# Patient Record
Sex: MALE | Race: WHITE | NOT HISPANIC OR LATINO | URBAN - METROPOLITAN AREA
[De-identification: names, ages, dates, MRNs, and addresses within clinical notes are randomized per-mention and may not be internally consistent; named-entity substitution may affect disease eponyms.]

---

## 2024-06-03 NOTE — ASU PATIENT PROFILE, ADULT - NS PREOP UNDERSTANDS INFO
Bring photo ID, insurance ,credit card/yes Bring photo ID, insurance ,credit cardbring photo id/insurance/credit card. Wear loose  comfort clothes. No alcohol/no drugs/smoking/no chewing gum/candies/milk. Must have an Escort. Address and phone #provided/yes

## 2024-06-03 NOTE — ASU PATIENT PROFILE, ADULT - NSICDXPASTSURGICALHX_GEN_ALL_CORE_FT
Pt was at his doctor for chemo treatment and was found to be hyperglycemic. . States complaint with meds.    PAST SURGICAL HISTORY:  H/O hemicolectomy     S/P appendectomy

## 2024-06-04 ENCOUNTER — OUTPATIENT (OUTPATIENT)
Dept: OUTPATIENT SERVICES | Facility: HOSPITAL | Age: 54
LOS: 1 days | Discharge: HOME | End: 2024-06-04
Payer: COMMERCIAL

## 2024-06-04 VITALS
RESPIRATION RATE: 16 BRPM | TEMPERATURE: 97 F | DIASTOLIC BLOOD PRESSURE: 76 MMHG | SYSTOLIC BLOOD PRESSURE: 112 MMHG | OXYGEN SATURATION: 97 % | HEIGHT: 68 IN | HEART RATE: 61 BPM | WEIGHT: 205.69 LBS

## 2024-06-04 VITALS
RESPIRATION RATE: 15 BRPM | OXYGEN SATURATION: 97 % | DIASTOLIC BLOOD PRESSURE: 78 MMHG | HEART RATE: 58 BPM | SYSTOLIC BLOOD PRESSURE: 133 MMHG

## 2024-06-04 DIAGNOSIS — Z90.49 ACQUIRED ABSENCE OF OTHER SPECIFIED PARTS OF DIGESTIVE TRACT: Chronic | ICD-10-CM

## 2024-06-04 PROCEDURE — 88305 TISSUE EXAM BY PATHOLOGIST: CPT | Mod: 26

## 2024-06-04 PROCEDURE — 88300 SURGICAL PATH GROSS: CPT | Mod: 26,59

## 2024-06-04 PROCEDURE — 88311 DECALCIFY TISSUE: CPT | Mod: 26

## 2024-06-04 DEVICE — SYS CATH BALLOON RELIEVA SINUS 6X16MM: Type: IMPLANTABLE DEVICE | Status: FUNCTIONAL

## 2024-06-04 DEVICE — SPLINT INTRANASAL POSISEP X 0.6X2IN: Type: IMPLANTABLE DEVICE | Status: FUNCTIONAL

## 2024-06-04 RX ORDER — OXYCODONE HYDROCHLORIDE 5 MG/1
5 TABLET ORAL ONCE
Refills: 0 | Status: DISCONTINUED | OUTPATIENT
Start: 2024-06-04 | End: 2024-06-04

## 2024-06-04 RX ORDER — APREPITANT 80 MG/1
40 CAPSULE ORAL ONCE
Refills: 0 | Status: COMPLETED | OUTPATIENT
Start: 2024-06-04 | End: 2024-06-04

## 2024-06-04 RX ORDER — SODIUM CHLORIDE 9 MG/ML
1000 INJECTION, SOLUTION INTRAVENOUS
Refills: 0 | Status: DISCONTINUED | OUTPATIENT
Start: 2024-06-04 | End: 2024-06-04

## 2024-06-04 RX ORDER — ACETAMINOPHEN 500 MG
650 TABLET ORAL ONCE
Refills: 0 | Status: COMPLETED | OUTPATIENT
Start: 2024-06-04 | End: 2024-06-04

## 2024-06-04 RX ORDER — ACETAMINOPHEN 500 MG
1000 TABLET ORAL ONCE
Refills: 0 | Status: COMPLETED | OUTPATIENT
Start: 2024-06-04 | End: 2024-06-04

## 2024-06-04 RX ORDER — ONDANSETRON 8 MG/1
4 TABLET, FILM COATED ORAL ONCE
Refills: 0 | Status: DISCONTINUED | OUTPATIENT
Start: 2024-06-04 | End: 2024-06-04

## 2024-06-04 RX ADMIN — Medication 650 MILLIGRAM(S): at 13:57

## 2024-06-04 RX ADMIN — APREPITANT 40 MILLIGRAM(S): 80 CAPSULE ORAL at 08:35

## 2024-06-04 RX ADMIN — Medication 1000 MILLIGRAM(S): at 08:35

## 2024-06-04 NOTE — BRIEF OPERATIVE NOTE - OPERATION/FINDINGS
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 Inspissated secrtions left maxillary and frontal worst

## 2024-06-04 NOTE — PRE-ANESTHESIA EVALUATION ADULT - NSPROPOSEDPROCEDFT_GEN_ALL_CORE
septoplasty, bilateral SMR turbinates, bilateral frontal and sphenoid balloon, bilateral maxillary antrostomy, bilateral ethmoidectomy

## 2024-06-04 NOTE — BRIEF OPERATIVE NOTE - NSICDXBRIEFPROCEDURE_GEN_ALL_CORE_FT
PROCEDURES:  Septoplasty, nasal, with turbinate submucous resection 04-Jun-2024 13:38:15  Naveed Leigh

## 2024-06-04 NOTE — PRE-ANESTHESIA EVALUATION ADULT - NSANTHAIRWAYFT_ENT_ALL_CORE
Patient's airway examined, Neck FROM, dentition intact. TMJ FROM. Good mouth opening  Midline trachea.  Adequate oral aperture/mento-hyoid distance/cervical range of motion.  CV RRR  CTAB Patient's airway examined, Neck  with somewhat limited extension and rotation, dentition intact. TMJ FROM. Good mouth opening  Midline trachea.  Adequate oral aperture/mento-hyoid distance/somewhat limited cervical range of motion.  CV RRR  CTAB

## 2024-06-04 NOTE — PRE-ANESTHESIA EVALUATION ADULT - NSANTHSUBSTSD_GEN_ALL_CORE
OP   Telephone Anticoagulation Service Note      Anticoagulation Summary  As of 3/8/2021    INR goal:  2.0-3.0   TTR:  74.5 % (5.8 y)   INR used for dosing:  3.20 (3/8/2021)   Warfarin maintenance plan:  2.5 mg (5 mg x 0.5) every Fri; 5 mg (5 mg x 1) all other days   Weekly warfarin total:  32.5 mg   Plan last modified:  Fabio Cabrera PharmD (8/28/2020)   Next INR check:  3/22/2021   Target end date:  Indefinite    Indications    Pulmonary embolism (HCC) [I26.99]             Anticoagulation Episode Summary     INR check location:  Anticoagulation Clinic    Preferred lab:      Send INR reminders to:      Comments:  indef per PCP       Anticoagulation Care Providers     Provider Role Specialty Phone number    Tom Vega D.O. Referring Internal Medicine [phone]    [facility] Responsible  [phone]        Anticoagulation Patient Findings    Left a message on the patient's voicemail today, informing the patient of a SUPRA-therapeutic INR of 3.2.  Instructed the patient to call the clinic with any changes to diet or medications, with any signs/sx of bleeding or clotting or with any questions or concerns.     Patient instructed to reduce dose to 2.5mg TONIGHT, as a one time dose adjustment, then to resume her current dosing regimen.   Patient asked to retest again in 2 weeks.     Sarath CurtisD     No remote marijuana use/Yes

## 2024-06-14 LAB — SURGICAL PATHOLOGY STUDY: SIGNIFICANT CHANGE UP

## (undated) DEVICE — SUT CHROMIC 6-0 18" S-14

## (undated) DEVICE — APPLICATOR COTTON TIP 6"

## (undated) DEVICE — SOL ANTI FOG

## (undated) DEVICE — ELCTR BOVIE PENCIL BLADE 10FT

## (undated) DEVICE — BLADE MEDTRONIC ENT FUSION QUADCUT ROTATABLE STRAIGHT 4.3MM X 13CM

## (undated) DEVICE — SUT PROLENE 3-0 18" PS-2

## (undated) DEVICE — SUT CHROMIC 4-0 27" RB-1

## (undated) DEVICE — PACK RHINOPLASTY

## (undated) DEVICE — MARKING PEN W RULER

## (undated) DEVICE — SUT PDS II 4-0 18" P-3

## (undated) DEVICE — BLADE MEDTRONIC ENT INFERIOR TURBINATE ROTATABLE STRAIGHT 2MM X11CM

## (undated) DEVICE — DRSG AQUAPLAST BLANK BLUSH 3 X 3"

## (undated) DEVICE — DRSG GAUZE SPONGE 2X2" STERILE

## (undated) DEVICE — Device

## (undated) DEVICE — SUT PLAIN GUT 4-0 18" P-3

## (undated) DEVICE — SUT PROLENE 6-0 18" P-1

## (undated) DEVICE — PETRI DISH MED 3.5"

## (undated) DEVICE — VENODYNE/SCD SLEEVE CALF MEDIUM

## (undated) DEVICE — DRAPE MAYO STAND 23"

## (undated) DEVICE — MEDTRONIC AXIEM PATIENT TRACKER NON-INVASIVE

## (undated) DEVICE — PREP BETADINE KIT

## (undated) DEVICE — MEDTRONIC INSTRUMENT TRACKER ENT

## (undated) DEVICE — ACCLARENT SET INFLATION DEVICE

## (undated) DEVICE — GLV 7.5 PROTEXIS (WHITE)

## (undated) DEVICE — TUBING SUCTION NONCONDUCTIVE 6MM X 12FT

## (undated) DEVICE — SUT PLAIN GUT FAST ABSORBING 5-0 PC-1

## (undated) DEVICE — DRSG TEGADERM 2.5X3"

## (undated) DEVICE — SUT CHROMIC 4-0 18" G-3

## (undated) DEVICE — SUT PLAIN GUT 4-0 18" SC-1

## (undated) DEVICE — SUT PDS II 5-0 27" RB-1